# Patient Record
Sex: FEMALE | Race: WHITE
[De-identification: names, ages, dates, MRNs, and addresses within clinical notes are randomized per-mention and may not be internally consistent; named-entity substitution may affect disease eponyms.]

---

## 2019-08-08 ENCOUNTER — HOSPITAL ENCOUNTER (EMERGENCY)
Dept: HOSPITAL 10 - FTE | Age: 49
Discharge: HOME | End: 2019-08-08
Payer: COMMERCIAL

## 2019-08-08 ENCOUNTER — HOSPITAL ENCOUNTER (EMERGENCY)
Dept: HOSPITAL 91 - FTE | Age: 49
Discharge: HOME | End: 2019-08-08
Payer: COMMERCIAL

## 2019-08-08 VITALS
BODY MASS INDEX: 32.58 KG/M2 | BODY MASS INDEX: 32.58 KG/M2 | HEIGHT: 59 IN | WEIGHT: 161.6 LBS | WEIGHT: 161.6 LBS | HEIGHT: 59 IN

## 2019-08-08 DIAGNOSIS — K64.5: Primary | ICD-10-CM

## 2019-08-08 DIAGNOSIS — I10: ICD-10-CM

## 2019-08-08 PROCEDURE — 99284 EMERGENCY DEPT VISIT MOD MDM: CPT

## 2019-08-08 NOTE — ERD
ER Documentation


Chief Complaint


Chief Complaint





MD ref: 2wk history of bloody stools. not dizzy/ weak.





HPI


49-year-old female presenting to the ED for a 2-week history of blood in stool. 


Patient denies shortness of breath abdominal pain headache blurry vision.  


Patient states this never happened to her before.  Patient states she has not 


used any medication or taking anything for this yet.  Patient's vitals were 


within normal limits.  Patient denies any allergies to medications.  Patient 


states that she cannot pinpoint any triggering event that caused the symptoms.





ROS


All systems reviewed and are negative except as per history of present illness.





Medications


Home Meds


Active Scripts


Psyllium Seed* (Metamucil* Powder) 1,040 Gm Powder, 10 GM PO TID for 10 Days, EA


   Prov:JAYJAY FONTAINE PA-C         19


Hydrocortisone Ac/Lidocaine (LIDOCAINE-HC 3-0.5% CREAM) 7 Gm Cream.appl, 7 GM RC


BID for 10 Days


   Prov:JAYJAY FONTAINE PA-C         19


Reported Medications


Amlodipine Besylate* (Norvasc*) 5 Mg Tablet, 5 MG PO DAILY


   12





Allergies


Allergies:  


Coded Allergies:  


     No Known Allergies (Verified  Allergy, Mild, 4/8/10)





PMhx/Soc


Medical and Surgical Hx:  pt denies Medical Hx


History of Surgery:  No


Anesthesia Reaction:  No


Hx Neurological Disorder:  No


Hx Respiratory Disorders:  No


Hx Cardiac Disorders:  Yes (HTN)


Hx Psychiatric Problems:  No


Hx Miscellaneous Medical Probl:  No


Hx Alcohol Use:  No


Hx Substance Use:  No


Hx Tobacco Use:  No


Smoking Status:  Never smoker





FmHx


Family History:  No diabetes, No coronary disease, No other





Physical Exam


Vitals





Vital Signs


  Date      Temp  Pulse  Resp  B/P (MAP)   Pulse Ox  O2          O2 Flow    FiO2


Time                                                 Delivery    Rate


    19  99.6     76    16      164/80        98


     18:00                          (108)





Physical Exam


GENERAL: The patient is well-appearing, well-nourished, in no acute distress


HEENT: Atraumatic.  Conjunctivae are pink.  Pupils equal, round, and reactive to


light.  There is no scleral icterus.  Tympanic membranes clear bilaterally.  


Oropharynx clear.  No nystagmus or photophobia.


NECK: C-spine is soft and supple.  There is no meningismus.  There is no 


cervical lymphadenopathy.  


CHEST: Clear to auscultation bilaterally.  There are no rales, wheezes or 


rhonchi.


HEART: Regular rate and rhythm.  No murmurs, clicks, rubs or gallops.


ABDOMEN:Soft, nontender and nondistended.  Good bowel sounds.  No rebound or 


guarding.  No gross peritonitis.  No gross organomegaly or masses.  No Holder 


sign or McBurney point tenderness.


BACK: No midline or flank tenderness.


Rectal: Rectal exam was done with chaperone ER tech.  A thrombosed hemorrhoid 


was noted on examination





Procedures/MDM


ED course:


The patient was stable throughout the ED course.  The patient and/or family 


informed of laboratory and diagnostic imaging results throughout the ED course.








Medical decision makin-year-old female presented to ED for blood in her stool.  Patient's physical 


exam was unremarkable except for a small thrombosed hemorrhoid.  The patient's 


abdomen was soft nontender the patient shows no signs of anemia vitals were 


normal limit.  The patient is afebrile.  Patient denies any pain.  Advised the 


patient that we can treat her with a topical cream and increase fiber in her 


diet.  I advised her that if symptoms persist she may need to see a hemorrhoid 


specialist.  The patient is an agreement to this treatment plan and plans to 


follow-up with her primary care provider 1 to 2 days.  Advised patient the 


symptoms worsen return to ER immediately.





Prescription for home:


Metamucil


Hydrocortisone lidocaine topical cream








I have discussed with the patient proper use and  common side effects to expert 


with the medication .  I advised  the patient/family to speak with the 


pharmacist dispensing the medication to be advised of any potential drug 


interactions with other medication or supplements they may be taking. 











Discharge:


At this time, patient is stable for discharge and outpatient management.  I have


instructed the patient to follow-up with his\her primary care physician in 1 to 


2 days.  I have discussed with the patient the possibility of needing to see a 


specialist for further work-up and imaging studies if symptoms persist.  I have 


instructed the patient to promptly return to the ER for any new or worsening 


symptoms including increased pain, fever, nausea, vomiting, weakness or LOC.  


The patient and\or family expressed understanding of and agreement with this 


plan.  All questions were answered.  Home care instructions were provided.








Disclaimer:


Inadvertent spelling and grammatical errors are likely due to EHR\dictation 


software use and do not reflect on the overall quality of patient care.  Also, 


please note that the electronic time recorded on the note does not necessarily 


reflect the actual time of the patient encounter.





Departure


Diagnosis:  


   Primary Impression:  


   Thrombosed external hemorrhoid


Condition:  Stable


Patient Instructions:  Thrombosed Hemorrhoids


Referrals:  


[facility]


YOU HAVE RECEIVED A MEDICAL SCREENING EXAM AND THE RESULTS INDICATE THAT YOU DO 


NOT HAVE A CONDITION THAT REQUIRES URGENT TREATMENT IN THE EMERGENCY DEPARTMENT.





FURTHER EVALUATION AND TREATMENT OF YOUR CONDITION CAN WAIT UNTIL YOU ARE SEEN 


IN YOUR DOCTORS OFFICE WITHIN THE NEXT 1-2 DAYS. IT IS YOUR RESPONSIBILITY TO 


MAKE AN APPOINTMENT FOR FOLOW-UP CARE.





IF YOU HAVE A PRIMARY DOCTOR


--you should call your primary doctor and schedule an appointment





IF YOU DO NOT HAVE A PRIMARY DOCTOR YOU CAN CALL OUR PHYSICIAN REFERRAL HOTLINE 


AT


 [phone] 





IF YOU CAN NOT AFFORD TO SEE A PHYSICIAN YOU CAN CHOSE FROM THE FOLLOWING 


[facility] [phone] [address] [facility]. [facility] [phone] [address] [facility]. [facility] [phone] [address] [facility] [phone] [address] [facility] [phone] [address] [facility]. [facility] [phone] [address] [facility]. [facility]


YOU HAVE RECEIVED A MEDICAL SCREENING EXAM AND THE RESULTS INDICATE THAT YOU DO 


NOT HAVE A CONDITION THAT REQUIRES URGENT TREATMENT IN THE EMERGENCY DEPARTMENT.





FURTHER EVALUATION AND TREATMENT OF YOUR CONDITION CAN WAIT UNTIL YOU ARE SEEN 


IN YOUR DOCTORS OFFICE WITHIN THE NEXT 1-2 DAYS. IT IS YOUR RESPONSIBILITY TO 


MAKE AN APPOINTMENT FOR FOLOW-UP CARE.





IF YOU HAVE A PRIMARY DOCTOR


--you should call your primary doctor and schedule and appointment





IF YOU DO NOT HAVE A PRIMARY DOCTOR YOU CAN CALL OUR PHYSICIAN REFERRAL HOTLINE 


AT [phone].





IF YOU CAN NOT AFFORD TO SEE A PHYSICIAN YOU CAN CHOSE FROM THE FOLLOWING [facility]


INSTITUTIONS:





[facility]


[address] [place] [address]





[facility]


[address] [place] [address]





[facility] + [address] [address]





Additional Instructions:  


Call your primary care doctor TOMORROW for an appointment during the next 1-2 


days.See the doctor sooner or return here if your condition worsens before your 


appointment time.











JAYJAY FONTAINE PA-C                Aug 8, 2019 20:43